# Patient Record
Sex: FEMALE | Race: WHITE | NOT HISPANIC OR LATINO | Employment: PART TIME | URBAN - METROPOLITAN AREA
[De-identification: names, ages, dates, MRNs, and addresses within clinical notes are randomized per-mention and may not be internally consistent; named-entity substitution may affect disease eponyms.]

---

## 2017-10-13 ENCOUNTER — LAB REQUISITION (OUTPATIENT)
Dept: LAB | Facility: OTHER | Age: 27
End: 2017-10-13

## 2017-10-13 DIAGNOSIS — Z13.9 SCREENING FOR CONDITION: ICD-10-CM

## 2017-10-13 PROCEDURE — 86803 HEPATITIS C AB TEST: CPT | Performed by: PREVENTIVE MEDICINE

## 2017-10-13 PROCEDURE — G0432 EIA HIV-1/HIV-2 SCREEN: HCPCS | Performed by: PREVENTIVE MEDICINE

## 2017-10-13 PROCEDURE — 87899 AGENT NOS ASSAY W/OPTIC: CPT | Performed by: PREVENTIVE MEDICINE

## 2017-10-14 LAB
HCV AB SER DONR QL: NORMAL
HIV1 P24 AG SER QL: NORMAL
HIV1+2 AB SER QL: NORMAL

## 2018-01-22 ENCOUNTER — LAB REQUISITION (OUTPATIENT)
Dept: LAB | Facility: OTHER | Age: 28
End: 2018-01-22
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
## 2018-01-22 DIAGNOSIS — Z77.21 EXPOSURE TO BODY FLUIDS BY CONTAMINATED HYPODERMIC NEEDLE STICK: ICD-10-CM

## 2018-01-22 PROCEDURE — 87899 AGENT NOS ASSAY W/OPTIC: CPT | Performed by: PREVENTIVE MEDICINE

## 2018-01-22 PROCEDURE — G0432 EIA HIV-1/HIV-2 SCREEN: HCPCS | Performed by: PREVENTIVE MEDICINE

## 2018-01-22 PROCEDURE — 86803 HEPATITIS C AB TEST: CPT | Performed by: PREVENTIVE MEDICINE

## 2018-01-23 LAB
HCV AB SER DONR QL: NORMAL
HIV1 P24 AG SER QL: NORMAL
HIV1+2 AB SER QL: NORMAL

## 2018-07-20 ENCOUNTER — LAB REQUISITION (OUTPATIENT)
Dept: LAB | Facility: OTHER | Age: 28
End: 2018-07-20

## 2018-07-20 DIAGNOSIS — IMO0002 NEEDLE STICK INJURY: ICD-10-CM

## 2018-07-20 PROCEDURE — G0432 EIA HIV-1/HIV-2 SCREEN: HCPCS | Performed by: PREVENTIVE MEDICINE

## 2018-07-20 PROCEDURE — 87899 AGENT NOS ASSAY W/OPTIC: CPT | Performed by: PREVENTIVE MEDICINE

## 2018-07-20 PROCEDURE — 86803 HEPATITIS C AB TEST: CPT | Performed by: PREVENTIVE MEDICINE

## 2018-07-21 LAB
HCV AB SER DONR QL: NORMAL
HIV1 P24 AG SER QL: NORMAL
HIV1+2 AB SER QL: NORMAL

## 2020-04-17 LAB
EXTERNAL CHLAMYDIA SCREEN: NORMAL
EXTERNAL GONORRHEA SCREEN: NORMAL
EXTERNAL VDRL: NORMAL

## 2020-11-19 ENCOUNTER — HOSPITAL ENCOUNTER (INPATIENT)
Facility: HOSPITAL | Age: 30
LOS: 2 days | Discharge: HOME | End: 2020-11-22
Attending: OBSTETRICS & GYNECOLOGY | Admitting: OBSTETRICS & GYNECOLOGY

## 2020-11-19 PROCEDURE — 87635 SARS-COV-2 COVID-19 AMP PRB: CPT | Performed by: OBSTETRICS & GYNECOLOGY

## 2020-11-19 PROCEDURE — 99201: CPT

## 2020-11-20 ENCOUNTER — ANESTHESIA (OUTPATIENT)
Dept: LABOR AND DELIVERY | Facility: HOSPITAL | Age: 30
End: 2020-11-20

## 2020-11-20 ENCOUNTER — ANESTHESIA EVENT (OUTPATIENT)
Dept: LABOR AND DELIVERY | Facility: HOSPITAL | Age: 30
End: 2020-11-20

## 2020-11-20 PROBLEM — Z37.9 NORMAL LABOR: Status: ACTIVE | Noted: 2020-11-20

## 2020-11-20 LAB
ABO GROUP BLD: NORMAL
AMPHET+METHAMPHET UR QL: NEGATIVE
BACTERIA UR QL AUTO: ABNORMAL /HPF
BARBITURATES UR QL SCN: NEGATIVE
BASOPHILS # BLD AUTO: 0.05 10*3/MM3 (ref 0–0.2)
BASOPHILS NFR BLD AUTO: 0.6 % (ref 0–1.5)
BENZODIAZ UR QL SCN: NEGATIVE
BILIRUB UR QL STRIP: NEGATIVE
BLD GP AB SCN SERPL QL: NEGATIVE
CANNABINOIDS SERPL QL: NEGATIVE
CLARITY UR: CLEAR
COCAINE UR QL: NEGATIVE
COLOR UR: YELLOW
DEPRECATED RDW RBC AUTO: 42.9 FL (ref 37–54)
EOSINOPHIL # BLD AUTO: 0.04 10*3/MM3 (ref 0–0.4)
EOSINOPHIL NFR BLD AUTO: 0.5 % (ref 0.3–6.2)
ERYTHROCYTE [DISTWIDTH] IN BLOOD BY AUTOMATED COUNT: 12.5 % (ref 12.3–15.4)
GLUCOSE UR STRIP-MCNC: NEGATIVE MG/DL
HCT VFR BLD AUTO: 38.1 % (ref 34–46.6)
HGB BLD-MCNC: 13.3 G/DL (ref 12–15.9)
HGB UR QL STRIP.AUTO: ABNORMAL
HYALINE CASTS UR QL AUTO: ABNORMAL /LPF
IMM GRANULOCYTES # BLD AUTO: 0.06 10*3/MM3 (ref 0–0.05)
IMM GRANULOCYTES NFR BLD AUTO: 0.7 % (ref 0–0.5)
KETONES UR QL STRIP: NEGATIVE
LEUKOCYTE ESTERASE UR QL STRIP.AUTO: NEGATIVE
LYMPHOCYTES # BLD AUTO: 2.81 10*3/MM3 (ref 0.7–3.1)
LYMPHOCYTES NFR BLD AUTO: 34.1 % (ref 19.6–45.3)
MCH RBC QN AUTO: 32.5 PG (ref 26.6–33)
MCHC RBC AUTO-ENTMCNC: 34.9 G/DL (ref 31.5–35.7)
MCV RBC AUTO: 93.2 FL (ref 79–97)
METHADONE UR QL SCN: NEGATIVE
MONOCYTES # BLD AUTO: 0.61 10*3/MM3 (ref 0.1–0.9)
MONOCYTES NFR BLD AUTO: 7.4 % (ref 5–12)
NEUTROPHILS NFR BLD AUTO: 4.68 10*3/MM3 (ref 1.7–7)
NEUTROPHILS NFR BLD AUTO: 56.7 % (ref 42.7–76)
NITRITE UR QL STRIP: NEGATIVE
NRBC BLD AUTO-RTO: 0 /100 WBC (ref 0–0.2)
OPIATES UR QL: NEGATIVE
OXYCODONE UR QL SCN: NEGATIVE
PH UR STRIP.AUTO: 6.5 [PH] (ref 5–8)
PLATELET # BLD AUTO: 200 10*3/MM3 (ref 140–450)
PMV BLD AUTO: 10.9 FL (ref 6–12)
PROT UR QL STRIP: NEGATIVE
RBC # BLD AUTO: 4.09 10*6/MM3 (ref 3.77–5.28)
RBC # UR: ABNORMAL /HPF
REF LAB TEST METHOD: ABNORMAL
RH BLD: POSITIVE
SARS-COV-2 RDRP RESP QL NAA+PROBE: NOT DETECTED
SP GR UR STRIP: <=1.005 (ref 1–1.03)
SQUAMOUS #/AREA URNS HPF: ABNORMAL /HPF
T&S EXPIRATION DATE: NORMAL
UROBILINOGEN UR QL STRIP: ABNORMAL
WBC # BLD AUTO: 8.25 10*3/MM3 (ref 3.4–10.8)
WBC UR QL AUTO: ABNORMAL /HPF

## 2020-11-20 PROCEDURE — 81001 URINALYSIS AUTO W/SCOPE: CPT | Performed by: OBSTETRICS & GYNECOLOGY

## 2020-11-20 PROCEDURE — 0UQMXZZ REPAIR VULVA, EXTERNAL APPROACH: ICD-10-PCS | Performed by: OBSTETRICS & GYNECOLOGY

## 2020-11-20 PROCEDURE — C1755 CATHETER, INTRASPINAL: HCPCS

## 2020-11-20 PROCEDURE — 80307 DRUG TEST PRSMV CHEM ANLYZR: CPT | Performed by: OBSTETRICS & GYNECOLOGY

## 2020-11-20 PROCEDURE — 86901 BLOOD TYPING SEROLOGIC RH(D): CPT | Performed by: OBSTETRICS & GYNECOLOGY

## 2020-11-20 PROCEDURE — 86850 RBC ANTIBODY SCREEN: CPT | Performed by: OBSTETRICS & GYNECOLOGY

## 2020-11-20 PROCEDURE — 86900 BLOOD TYPING SEROLOGIC ABO: CPT | Performed by: OBSTETRICS & GYNECOLOGY

## 2020-11-20 PROCEDURE — C1755 CATHETER, INTRASPINAL: HCPCS | Performed by: ANESTHESIOLOGY

## 2020-11-20 PROCEDURE — 85025 COMPLETE CBC W/AUTO DIFF WBC: CPT | Performed by: OBSTETRICS & GYNECOLOGY

## 2020-11-20 RX ORDER — IBUPROFEN 600 MG/1
600 TABLET ORAL EVERY 8 HOURS PRN
Status: DISCONTINUED | OUTPATIENT
Start: 2020-11-20 | End: 2020-11-22 | Stop reason: HOSPADM

## 2020-11-20 RX ORDER — DIPHENHYDRAMINE HYDROCHLORIDE 50 MG/ML
12.5 INJECTION INTRAMUSCULAR; INTRAVENOUS EVERY 8 HOURS PRN
Status: DISCONTINUED | OUTPATIENT
Start: 2020-11-20 | End: 2020-11-20 | Stop reason: HOSPADM

## 2020-11-20 RX ORDER — DOCUSATE SODIUM 100 MG/1
100 CAPSULE, LIQUID FILLED ORAL 2 TIMES DAILY
Status: DISCONTINUED | OUTPATIENT
Start: 2020-11-20 | End: 2020-11-22 | Stop reason: HOSPADM

## 2020-11-20 RX ORDER — SODIUM CHLORIDE 0.9 % (FLUSH) 0.9 %
1-10 SYRINGE (ML) INJECTION AS NEEDED
Status: DISCONTINUED | OUTPATIENT
Start: 2020-11-20 | End: 2020-11-22 | Stop reason: HOSPADM

## 2020-11-20 RX ORDER — MISOPROSTOL 200 UG/1
800 TABLET ORAL AS NEEDED
Status: DISCONTINUED | OUTPATIENT
Start: 2020-11-20 | End: 2020-11-20 | Stop reason: HOSPADM

## 2020-11-20 RX ORDER — HYDROCODONE BITARTRATE AND ACETAMINOPHEN 5; 325 MG/1; MG/1
1 TABLET ORAL EVERY 4 HOURS PRN
Status: DISCONTINUED | OUTPATIENT
Start: 2020-11-20 | End: 2020-11-22 | Stop reason: HOSPADM

## 2020-11-20 RX ORDER — OXYTOCIN-SODIUM CHLORIDE 0.9% IV SOLN 30 UNIT/500ML 30-0.9/5 UT/ML-%
250 SOLUTION INTRAVENOUS CONTINUOUS
Status: ACTIVE | OUTPATIENT
Start: 2020-11-20 | End: 2020-11-20

## 2020-11-20 RX ORDER — PRENATAL VIT/IRON FUM/FOLIC AC 27MG-0.8MG
1 TABLET ORAL DAILY
Status: DISCONTINUED | OUTPATIENT
Start: 2020-11-20 | End: 2020-11-22 | Stop reason: HOSPADM

## 2020-11-20 RX ORDER — SODIUM CHLORIDE 0.9 % (FLUSH) 0.9 %
10 SYRINGE (ML) INJECTION AS NEEDED
Status: DISCONTINUED | OUTPATIENT
Start: 2020-11-20 | End: 2020-11-20 | Stop reason: HOSPADM

## 2020-11-20 RX ORDER — HYDROCORTISONE 25 MG/G
1 CREAM TOPICAL AS NEEDED
Status: DISCONTINUED | OUTPATIENT
Start: 2020-11-20 | End: 2020-11-22 | Stop reason: HOSPADM

## 2020-11-20 RX ORDER — OXYTOCIN-SODIUM CHLORIDE 0.9% IV SOLN 30 UNIT/500ML 30-0.9/5 UT/ML-%
125 SOLUTION INTRAVENOUS CONTINUOUS PRN
Status: DISCONTINUED | OUTPATIENT
Start: 2020-11-20 | End: 2020-11-22 | Stop reason: HOSPADM

## 2020-11-20 RX ORDER — SODIUM CHLORIDE, SODIUM LACTATE, POTASSIUM CHLORIDE, CALCIUM CHLORIDE 600; 310; 30; 20 MG/100ML; MG/100ML; MG/100ML; MG/100ML
125 INJECTION, SOLUTION INTRAVENOUS CONTINUOUS
Status: DISCONTINUED | OUTPATIENT
Start: 2020-11-20 | End: 2020-11-22 | Stop reason: HOSPADM

## 2020-11-20 RX ORDER — SODIUM CHLORIDE 0.9 % (FLUSH) 0.9 %
3 SYRINGE (ML) INJECTION EVERY 12 HOURS SCHEDULED
Status: DISCONTINUED | OUTPATIENT
Start: 2020-11-20 | End: 2020-11-20 | Stop reason: HOSPADM

## 2020-11-20 RX ORDER — ONDANSETRON 4 MG/1
4 TABLET, FILM COATED ORAL EVERY 6 HOURS PRN
Status: DISCONTINUED | OUTPATIENT
Start: 2020-11-20 | End: 2020-11-22 | Stop reason: HOSPADM

## 2020-11-20 RX ORDER — OXYTOCIN-SODIUM CHLORIDE 0.9% IV SOLN 30 UNIT/500ML 30-0.9/5 UT/ML-%
250 SOLUTION INTRAVENOUS CONTINUOUS PRN
Status: DISPENSED | OUTPATIENT
Start: 2020-11-20 | End: 2020-11-20

## 2020-11-20 RX ORDER — CALCIUM CARBONATE 200(500)MG
2 TABLET,CHEWABLE ORAL 3 TIMES DAILY PRN
Status: DISCONTINUED | OUTPATIENT
Start: 2020-11-20 | End: 2020-11-22 | Stop reason: HOSPADM

## 2020-11-20 RX ORDER — ONDANSETRON 2 MG/ML
4 INJECTION INTRAMUSCULAR; INTRAVENOUS EVERY 6 HOURS PRN
Status: DISCONTINUED | OUTPATIENT
Start: 2020-11-20 | End: 2020-11-22 | Stop reason: HOSPADM

## 2020-11-20 RX ORDER — OXYTOCIN-SODIUM CHLORIDE 0.9% IV SOLN 30 UNIT/500ML 30-0.9/5 UT/ML-%
999 SOLUTION INTRAVENOUS ONCE
Status: COMPLETED | OUTPATIENT
Start: 2020-11-20 | End: 2020-11-20

## 2020-11-20 RX ORDER — MAGNESIUM CARB/ALUMINUM HYDROX 105-160MG
30 TABLET,CHEWABLE ORAL ONCE
Status: DISCONTINUED | OUTPATIENT
Start: 2020-11-20 | End: 2020-11-20 | Stop reason: HOSPADM

## 2020-11-20 RX ORDER — OXYTOCIN-SODIUM CHLORIDE 0.9% IV SOLN 30 UNIT/500ML 30-0.9/5 UT/ML-%
125 SOLUTION INTRAVENOUS CONTINUOUS PRN
Status: COMPLETED | OUTPATIENT
Start: 2020-11-20 | End: 2020-11-20

## 2020-11-20 RX ORDER — ERYTHROMYCIN 5 MG/G
OINTMENT OPHTHALMIC
Status: DISPENSED
Start: 2020-11-20 | End: 2020-11-20

## 2020-11-20 RX ORDER — FAMOTIDINE 10 MG/ML
20 INJECTION, SOLUTION INTRAVENOUS ONCE AS NEEDED
Status: DISCONTINUED | OUTPATIENT
Start: 2020-11-20 | End: 2020-11-20 | Stop reason: HOSPADM

## 2020-11-20 RX ORDER — OXYTOCIN-SODIUM CHLORIDE 0.9% IV SOLN 30 UNIT/500ML 30-0.9/5 UT/ML-%
999 SOLUTION INTRAVENOUS ONCE
Status: DISCONTINUED | OUTPATIENT
Start: 2020-11-20 | End: 2020-11-22 | Stop reason: HOSPADM

## 2020-11-20 RX ORDER — MISOPROSTOL 200 UG/1
800 TABLET ORAL EVERY 8 HOURS PRN
Status: DISCONTINUED | OUTPATIENT
Start: 2020-11-20 | End: 2020-11-22 | Stop reason: HOSPADM

## 2020-11-20 RX ORDER — ONDANSETRON 2 MG/ML
4 INJECTION INTRAMUSCULAR; INTRAVENOUS ONCE AS NEEDED
Status: DISCONTINUED | OUTPATIENT
Start: 2020-11-20 | End: 2020-11-20 | Stop reason: HOSPADM

## 2020-11-20 RX ORDER — OXYCODONE HYDROCHLORIDE AND ACETAMINOPHEN 5; 325 MG/1; MG/1
1 TABLET ORAL EVERY 4 HOURS PRN
Status: DISCONTINUED | OUTPATIENT
Start: 2020-11-20 | End: 2020-11-22 | Stop reason: HOSPADM

## 2020-11-20 RX ORDER — METHYLERGONOVINE MALEATE 0.2 MG/ML
200 INJECTION INTRAVENOUS ONCE AS NEEDED
Status: DISCONTINUED | OUTPATIENT
Start: 2020-11-20 | End: 2020-11-20 | Stop reason: HOSPADM

## 2020-11-20 RX ORDER — EPHEDRINE SULFATE 50 MG/ML
5 INJECTION, SOLUTION INTRAVENOUS AS NEEDED
Status: DISCONTINUED | OUTPATIENT
Start: 2020-11-20 | End: 2020-11-20 | Stop reason: HOSPADM

## 2020-11-20 RX ORDER — CARBOPROST TROMETHAMINE 250 UG/ML
250 INJECTION, SOLUTION INTRAMUSCULAR AS NEEDED
Status: DISCONTINUED | OUTPATIENT
Start: 2020-11-20 | End: 2020-11-20 | Stop reason: HOSPADM

## 2020-11-20 RX ORDER — DIPHENHYDRAMINE HCL 25 MG
25 CAPSULE ORAL NIGHTLY PRN
Status: DISCONTINUED | OUTPATIENT
Start: 2020-11-20 | End: 2020-11-22 | Stop reason: HOSPADM

## 2020-11-20 RX ORDER — BISACODYL 10 MG
10 SUPPOSITORY, RECTAL RECTAL DAILY PRN
Status: DISCONTINUED | OUTPATIENT
Start: 2020-11-21 | End: 2020-11-22 | Stop reason: HOSPADM

## 2020-11-20 RX ORDER — LIDOCAINE HYDROCHLORIDE AND EPINEPHRINE 15; 5 MG/ML; UG/ML
INJECTION, SOLUTION EPIDURAL AS NEEDED
Status: DISCONTINUED | OUTPATIENT
Start: 2020-11-20 | End: 2020-11-20 | Stop reason: SURG

## 2020-11-20 RX ORDER — PHYTONADIONE 1 MG/.5ML
INJECTION, EMULSION INTRAMUSCULAR; INTRAVENOUS; SUBCUTANEOUS
Status: DISPENSED
Start: 2020-11-20 | End: 2020-11-20

## 2020-11-20 RX ORDER — LANOLIN
CREAM (ML) TOPICAL
Status: DISCONTINUED | OUTPATIENT
Start: 2020-11-20 | End: 2020-11-22 | Stop reason: HOSPADM

## 2020-11-20 RX ORDER — LIDOCAINE HYDROCHLORIDE 10 MG/ML
5 INJECTION, SOLUTION EPIDURAL; INFILTRATION; INTRACAUDAL; PERINEURAL AS NEEDED
Status: DISCONTINUED | OUTPATIENT
Start: 2020-11-20 | End: 2020-11-20 | Stop reason: HOSPADM

## 2020-11-20 RX ADMIN — SODIUM CHLORIDE, POTASSIUM CHLORIDE, SODIUM LACTATE AND CALCIUM CHLORIDE 125 ML/HR: 600; 310; 30; 20 INJECTION, SOLUTION INTRAVENOUS at 04:58

## 2020-11-20 RX ADMIN — Medication 1 TABLET: at 10:47

## 2020-11-20 RX ADMIN — Medication 10 ML/HR: at 00:58

## 2020-11-20 RX ADMIN — LIDOCAINE HYDROCHLORIDE AND EPINEPHRINE 3 ML: 15; 5 INJECTION, SOLUTION EPIDURAL at 00:54

## 2020-11-20 RX ADMIN — IBUPROFEN 600 MG: 600 TABLET, FILM COATED ORAL at 10:48

## 2020-11-20 RX ADMIN — EPHEDRINE SULFATE 5 MG: 50 INJECTION INTRAVENOUS at 02:26

## 2020-11-20 RX ADMIN — HYDROCODONE BITARTRATE AND ACETAMINOPHEN 1 TABLET: 5; 325 TABLET ORAL at 10:47

## 2020-11-20 RX ADMIN — DOCUSATE SODIUM 100 MG: 100 CAPSULE, LIQUID FILLED ORAL at 19:57

## 2020-11-20 RX ADMIN — Medication 1 APPLICATION: at 10:47

## 2020-11-20 RX ADMIN — IBUPROFEN 600 MG: 600 TABLET, FILM COATED ORAL at 19:56

## 2020-11-20 RX ADMIN — HYDROCODONE BITARTRATE AND ACETAMINOPHEN 1 TABLET: 5; 325 TABLET ORAL at 19:56

## 2020-11-20 RX ADMIN — OXYTOCIN 125 ML/HR: 10 INJECTION, SOLUTION INTRAMUSCULAR; INTRAVENOUS at 07:59

## 2020-11-20 RX ADMIN — HYDROCODONE BITARTRATE AND ACETAMINOPHEN 1 TABLET: 5; 325 TABLET ORAL at 15:08

## 2020-11-20 RX ADMIN — SODIUM CHLORIDE, POTASSIUM CHLORIDE, SODIUM LACTATE AND CALCIUM CHLORIDE 999 ML/HR: 600; 310; 30; 20 INJECTION, SOLUTION INTRAVENOUS at 00:59

## 2020-11-20 RX ADMIN — OXYTOCIN 999 ML/HR: 10 INJECTION INTRAVENOUS at 06:20

## 2020-11-20 RX ADMIN — DOCUSATE SODIUM 100 MG: 100 CAPSULE, LIQUID FILLED ORAL at 10:47

## 2020-11-20 NOTE — PROGRESS NOTES
"Discharge Planning Assessment  [facility]     Patient Name: Anisha Mckeon  MRN: [id number]  Today's Date: 11/20/2020    Admit Date: 11/19/2020    Discharge Needs Assessment    No documentation.       Discharge Plan     Row Name 11/20/20 1552       Plan    Plan  Infant to discharge home with mother when medically ready. LCSW will follow for cord toxicology results. Naldo Eldridge, LUIW    Plan Comments  Mother: Anisha Mckeon;  [id number] Infant: Dianne Mckeon  [phone] LCSW was consulted for \" HX heroin use, had relapse during pregnancy and was in treatment\". LCSW spoke with NAWAF Lakhani who had no additional concerns. Of note, mother had a negative urine toxicology screen on file. There is no urine toxicology on infant. Cord toxicology was sent, LCSW will follow for cord toxicology results and will report to CPS if warranted. LCSW met with mother alone at bedside. Mother holding and interacting appropriately with infant during discussion. Mother verified address, phone and insurance. Mother reports she has not spoken with [employer] yet about adding infant to coverage. LCSW assured mother they will be contacting her to assist in adding infant to insurance. Mother reports she has a car seat, crib, bassinette, clothes, diapers and other supplies for infant. Mother plans to breastfeed and shared this is her third child so she feels comfortable doing so.  Mother reports a good support system in her boyfriend, mother, dad and friends. Mother has plans on infant follow up with [facility] [place] and feels comfortable scheduling appointments and will have transportation to appointments. Mother denies any violence, threats, or feeling unsafe. Mother denies any needs or concerns. LCSW educated mother about cord toxicology being sent and if it is positive a CPS report would be made at that time. Mother denies any substance use. Mother polite and cooperative with LCSW during discussion. " Mother denied any other needs or concerns. LCSW provided mother with resources packet and briefly discussed resources in packet. Packet includes info on WIC, HANDS, infant supplies, domestic violence, transportation, counseling, and general community resources. LCSW will follow for cord toxicology results. Naldo Eldirdge LCSW    Final Discharge Disposition Code  01 - home or self-care        Continued Care and Services - Admitted Since 11/19/2020    Coordination has not been started for this encounter.         Demographic Summary     Row Name 11/20/20 1552       General Information    Admission Type  inpatient    Arrived From  home    Referral Source  nursing    Reason for Consult  substance use concerns        Functional Status    No documentation.       Psychosocial     Row Name 11/20/20 1552       Emotion Mood WDL    Emotion/Mood/Affect WDL  WDL       Speech WDL    Speech WDL  WDL       Perceptual State WDL    Perceptual State WDL  WDL       Thought Process WDL    Thought Process WDL  WDL       Intellectual Performance WDL    Intellectual Performance WDL  WDL        Abuse/Neglect     Row Name 11/20/20 1552       Personal Safety    Feels Unsafe at Home or Work/School  no    Feels Threatened by Someone  no    Does Anyone Try to Keep You From Having Contact with Others or Doing Things Outside Your Home?  no    Physical Signs of Abuse Present  no        Legal    No documentation.       Substance Abuse    No documentation.       Patient Forms    No documentation.           Naldo Eldridge

## 2020-11-20 NOTE — NURSING NOTE
Repositioned to right tilt, fetal heart rate decreased to 100bpm for approx 80 seconds, with a slow return to baseline.  Continue to monitor closely.

## 2020-11-20 NOTE — ANESTHESIA PREPROCEDURE EVALUATION
Anesthesia Evaluation     Patient summary reviewed and Nursing notes reviewed   NPO Solid Status: > 8 hours  NPO Liquid Status: > 2 hours           Airway   Dental      Pulmonary - negative pulmonary ROS   Cardiovascular   Exercise tolerance: good (4-7 METS)    Rhythm: regular    (-) hypertension      Neuro/Psych- negative ROS  (-) seizures, CVA  GI/Hepatic/Renal/Endo - negative ROS   (-) diabetes    Musculoskeletal (-) negative ROS    Abdominal    Substance History - negative use  (-) alcohol use, drug use     OB/GYN    (+) Pregnant,         Other - negative ROS                       Anesthesia Plan    ASA 2     epidural       Anesthetic plan, all risks, benefits, and alternatives have been provided, discussed and informed consent has been obtained with: patient.

## 2020-11-20 NOTE — LACTATION NOTE
This note was copied from a baby's chart.  P3 term baby 10 hrs old. Mom reports he is breast feeding well so far. She nursed her other two children for 3 months and her milk dried up after she started back at work. She has a personal pump at home. Reviewed how to tell if baby is getting enough breast milk and encouraged to call for any assist or questions.

## 2020-11-20 NOTE — OBED NOTES
"[facility]  Anisha Simon  : 1990  MRN: [id number]  CSN: [id number]    OB ED Provider Note    Subjective   Chief Complaint   Patient presents with   • Rupture of Membranes     Anisha Simon is a 30 y.o. year old  with an Estimated Date of Delivery: 20 currently at 40w2d presenting with SROM at 2200.  She started eligio every 5 min prior to SROM, increasing to every 4 with increased intensity since.  She denies VB.  FM is present.      Prenatal care has been with Dr. Lorenzana.  It has been complicated by narcotics use.  She was in the MOST program however did not continue with it.  No record of subutex use in her prenatal record.  .    OB History    Para Term  AB Living   4 2 2 0 1 2   SAB TAB Ectopic Molar Multiple Live Births   1 0 0 0 0 2      # Outcome Date GA Lbr Grupo/2nd Weight Sex Delivery Anes PTL Lv   4 Current            3 Term 10/01/16 39w3d / 01:37 2864 g (6 lb 5 oz) F Vag-Spont EPI N AKSHAT      Name: LEONIDAS SIMON      Apgar1: 7  Apgar5: 8   2 SAB 09/30/15           1 Term 13 40w0d  2948 g (6 lb 8 oz) F Vag-Spont EPI N AKSHAT      Name: Rayray     Past Medical History:   Diagnosis Date   • Abnormal Pap smear of cervix    • HPV (human papilloma virus) infection      Past Surgical History:   Procedure Laterality Date   • APPENDECTOMY     • CERVICAL BIOPSY  W/ LOOP ELECTRODE EXCISION       No current facility-administered medications for this encounter.     No Known Allergies  Social History    Tobacco Use      Smoking status: Former Smoker        Quit date: 2020        Years since quittin.7      Smokeless tobacco: Never Used    Review of Systems   All other systems reviewed and are negative.        Objective   /75 (BP Location: Right arm, Patient Position: Sitting)   Pulse 81   Resp 18   Ht 160 cm (63\")   Breastfeeding Yes   BMI 26.31 kg/m²   General: well developed; well nourished  no acute distress   Abdomen: soft, non-tender; no " masses  gravid    FHT's: category 1      Cervix: was checked (by RN): 3.5 cm / 70 % / -2 grossly ruptured with + NTZ   Presentation: cephalic   Contractions: every 4 minutes   Chest: Unlabored respirations    CV:  RRR   Ext:   No C/C/E   Back: CVA tenderness is deferred bilateral        Prenatal Labs  Lab Results   Component Value Date    HGB 12.3 10/02/2016    RUBELLAABIGG Immune 2016    HEPBSAG Non-Reactive 2017    ABSCRN Negative 2016    SPE7OND4 Non-Reactive 2018    HEPCVIRUSABY Non-Reactive 2018    STREPGPB Negative 2016       Current Labs Reviewed   Pregnancy 28 week labs:   Lab Results   Component Value Date    HGB 12.3 10/02/2016    HCT 37.3 10/02/2016     Prenatal Panel:   Lab Results   Component Value Date    HGB 12.3 10/02/2016    RUBELLAABIGG Immune 2016    HEPBSAG Non-Reactive 2017    ABSCRN Negative 2016    FEG9FVB8 Non-Reactive 2018    HEPCVIRUSABY Non-Reactive 2018    STREPGPB Negative 2016          Assessment   1. IUP at 40w2d  2. SROM- GBS negative, in active labor.       Plan   1. Admit to L&D for anticipated .  She desires an epidural.   anticipated.  Dr. Rivera notified and is assuming management.      Becka Hanson MD  2020  00:02 EST

## 2020-11-20 NOTE — H&P
Labor and Delivery H&P     Patient Identification:  Name: Anisha Mckeon  Age: 30 y.o.  Sex: female  :  1990  MRN: [id number]       2020   02:25 EST              Subjective:  Anisha Mckeon is a 30 y.o., , female with estimate due date  of: 2020, by Patient Reported  at 40w2d weeks gestation who is being admitted for labor management s/p SROM. Patient reports ROM followed by painful ctx last evening.   Fetal Movement: normal. 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    Noninvasive genetic screens: patient declined  One hour GTT: Normal  MBT= O Positive , Rubella Immune , GBS Negative    Past Medical History:   Diagnosis Date   • Abnormal Pap smear of cervix    • HPV (human papilloma virus) infection      Past Surgical History:   Procedure Laterality Date   • APPENDECTOMY     • CERVICAL BIOPSY  W/ LOOP ELECTRODE EXCISION       OB History    Para Term  AB Living   4 2 2   1 2   SAB TAB Ectopic Molar Multiple Live Births   1       0 2      # Outcome Date GA Lbr Grupo/2nd Weight Sex Delivery Anes PTL Lv   4 Current            3 Term 10/01/16 39w3d / 01:37 2864 g (6 lb 5 oz) F Vag-Spont EPI N AKSHAT   2 SAB 09/30/15           1 Term 13 40w0d  2948 g (6 lb 8 oz) F Vag-Spont EPI N AKSHAT     No Known Allergies  Social History     Socioeconomic History   • Marital status: Significant Other     Spouse name: Not on file   • Number of children: Not on file   • Years of education: Not on file   • Highest education level: Not on file   Tobacco Use   • Smoking status: Former Smoker     Quit date: 2020     Years since quittin.7   • Smokeless tobacco: Never Used   Substance and Sexual Activity   • Alcohol use: No   • Drug use: Yes     Types: Heroin   • Sexual activity: Yes     Partners: Male     History reviewed. No pertinent family history.  Immunization History   Administered Date(s) Administered   • Tdap  08/24/2016       Objective:  Vital signs:  Temp:  [98.7 °F (37.1 °C)] 98.7 °F (37.1 °C)  Heart Rate:  [70-90] 71  Resp:  [18] 18  BP: ()/(51-75) 97/51    General:   alert, appears stated age and cooperative  Skin:   normal  HEENT:  PERRLA  Lungs:   clear to auscultation bilaterally  Heart:   regular rate and rhythm, S1, S2 normal, no murmur, click, rub or gallop  Abdomen:  soft, non-tender; bowel sounds normal; no masses,  no organomegaly  Pelvis:  5-6/90/-1  FHT:  130s BPM Category 1 a few late decelerations after epidural a/w low maternal BP 97/51 relieved with ephedrine  Uterine Size: size equals dates  Presentations: cephalic    Lab Review  Recent Results (from the past 24 hour(s))   COVID-19, [facility] IN-HOUSE,NP Swab (NO TRANSPORT MEDIA) 2 HR TAT - Swab, Nasopharynx    Collection Time: 11/19/20 11:46 PM    Specimen: Nasopharynx; Swab   Result Value Ref Range    COVID19 Not Detected Not Detected - Ref. Range   Type & Screen    Collection Time: 11/20/20 12:11 AM    Specimen: Blood   Result Value Ref Range    ABO Type O     RH type Positive     Antibody Screen Negative     T&S Expiration Date 11/23/2020 11:59:59 PM    CBC Auto Differential    Collection Time: 11/20/20 12:11 AM    Specimen: Blood   Result Value Ref Range    WBC 8.25 3.40 - 10.80 10*3/mm3    RBC 4.09 3.77 - 5.28 10*6/mm3    Hemoglobin 13.3 12.0 - 15.9 g/dL    Hematocrit 38.1 34.0 - 46.6 %    MCV 93.2 79.0 - 97.0 fL    MCH 32.5 26.6 - 33.0 pg    MCHC 34.9 31.5 - 35.7 g/dL    RDW 12.5 12.3 - 15.4 %    RDW-SD 42.9 37.0 - 54.0 fl    MPV 10.9 6.0 - 12.0 fL    Platelets 200 140 - 450 10*3/mm3    Neutrophil % 56.7 42.7 - 76.0 %    Lymphocyte % 34.1 19.6 - 45.3 %    Monocyte % 7.4 5.0 - 12.0 %    Eosinophil % 0.5 0.3 - 6.2 %    Basophil % 0.6 0.0 - 1.5 %    Immature Grans % 0.7 (H) 0.0 - 0.5 %    Neutrophils, Absolute 4.68 1.70 - 7.00 10*3/mm3    Lymphocytes, Absolute 2.81 0.70 - 3.10 10*3/mm3    Monocytes, Absolute 0.61 0.10 - 0.90 10*3/mm3     Eosinophils, Absolute 0.04 0.00 - 0.40 10*3/mm3    Basophils, Absolute 0.05 0.00 - 0.20 10*3/mm3    Immature Grans, Absolute 0.06 (H) 0.00 - 0.05 10*3/mm3    nRBC 0.0 0.0 - 0.2 /100 WBC   Urinalysis With Microscopic If Indicated (No Culture) - Urine, Clean Catch    Collection Time: 20 12:23 AM    Specimen: Urine, Clean Catch   Result Value Ref Range    Color, UA Yellow Yellow, Straw    Appearance, UA Clear Clear    pH, UA 6.5 5.0 - 8.0    Specific Gravity, UA <=1.005 1.005 - 1.030    Glucose, UA Negative Negative    Ketones, UA Negative Negative    Bilirubin, UA Negative Negative    Blood, UA Trace (A) Negative    Protein, UA Negative Negative    Leuk Esterase, UA Negative Negative    Nitrite, UA Negative Negative    Urobilinogen, UA 0.2 E.U./dL 0.2 - 1.0 E.U./dL   Urine Drug Screen - Urine, Clean Catch    Collection Time: 20 12:23 AM    Specimen: Urine, Clean Catch   Result Value Ref Range    Amphet/Methamphet, Screen Negative Negative    Barbiturates Screen, Urine Negative Negative    Benzodiazepine Screen, Urine Negative Negative    Cocaine Screen, Urine Negative Negative    Opiate Screen Negative Negative    THC, Screen, Urine Negative Negative    Methadone Screen, Urine Negative Negative    Oxycodone Screen, Urine Negative Negative   Urinalysis, Microscopic Only - Urine, Clean Catch    Collection Time: 20 12:23 AM    Specimen: Urine, Clean Catch   Result Value Ref Range    RBC, UA 6-12 (A) None Seen, 0-2 /HPF    WBC, UA 0-2 None Seen, 0-2 /HPF    Bacteria, UA None Seen None Seen /HPF    Squamous Epithelial Cells, UA 0-2 None Seen, 0-2 /HPF    Hyaline Casts, UA 0-2 None Seen /LPF    Methodology Automated Microscopy         Assessment/Plan:  Patient Active Problem List   Diagnosis   • Pregnancy   • Normal labor     30 y.o.  40w2d adm for mgmt labor following SROM, progressing without augmentation. Comfortable with epidural.       Risks, benefits, alternatives and possible complications  have been discussed in detail with the patient.  Pre-admission, admission, and post admission procedures and expectations were discussed in detail.  All questions answered, all appropriate consents will be signed at the Hospital. Admission is planned for today. Continue present management. and Expectant management.    Leeanne Rivera MD  11/20/2020   02:25 EST

## 2020-11-20 NOTE — L&D DELIVERY NOTE
[facility]  Vaginal Delivery Note    Anisha Mckeon  1990   [id number]      Delivery     Delivery: Vaginal, Spontaneous     YOB: 2022    Time of Birth:  Gestational Age 6:15 AM   40w2d     Anesthesia: Epidural     Delivering clinician: Leeanne Rivera    Forceps?   No   Vacuum? No    Shoulder dystocia present: No        Delivery narrative: Smiley is a 30-year-old  female  admitted at 40 weeks and 2 days with spontaneous rupture of membranes.  Her prenatal care has been complicated by history of drug use now sober many years.  She also has a history of LEEP procedure.  Her maternal blood type is O+, she is rubella immune and her group B strep culture was negative.  She was made to labor and delivery and immediately received an epidural.  Fetal heart rate tracing was in the 130s to 140s and category 1.  She progressed without incident not requiring Pitocin.  She became completely dilated just after 0600.  She ended up pushing with 1 contraction to deliver a viable male  from a left occiput anterior position and compound presentation with the left  arm.  Bulb suction was performed on the perineum.  The remainder the fetal body was delivered atraumatically with maternal expulsive forces.  He was laid on the maternal abdomen where he was vigorous and crying.  Delayed cord clamping was performed.  Additional delivery statistics as per below.  Weight pending due to Kangaroo care.  The placenta then delivered spontaneously and intact with normal configuration and three-vessel cord.  The fundus was firm and estimated blood loss was 150 cc.  3-0 Vicryl was used to repair a left first-degree labial laceration and a first-degree midline laceration.  Vaginal exam at and showed no foreign bodies or sponges in the vagina and count was correct per myself and delivery team.  Epidural worked beautifully and overall there are no complications.    Infant    Findings: male  infant      Infant observations: Weight: No birth weight on file.   Length:   in  Observations/Comments:  scale 4      Apgars: 8  @ 1 minute /    9  @ 5 minutes         Placenta, Cord, and Fluid    Placenta delivered  Spontaneous  at   11/20/2020  6:20 AM     Cord: 3 vessels  present.   Nuchal Cord?  no   Cord blood obtained: Yes    Cord gases obtained:  No    Cord gas results: Venous:  No results found for: PHCVEN    Arterial:  No results found for: PHCART     Repair    Episiotomy: Not recorded     No    Lacerations: Yes  Laceration Information  Laceration Repaired?   Perineal: 1st      Periurethral:       Labial: left      Sulcus:       Vaginal:       Cervical:         Suture used for repair: 3-0 Vicryl   Estimated Blood Loss: Est. Blood Loss (mL): 150 mL(Filed from Delivery Summary) (11/20/20 0615)           Complications  none    Disposition  Mother to Mother Baby/Postpartum  in stable condition currently.  Baby to NBN  in stable condition currently.      Leeanne Rivera MD  11/20/20  06:37 EST

## 2020-11-20 NOTE — ANESTHESIA POSTPROCEDURE EVALUATION
"Patient: Anisha Mckeon    Procedure Summary     Date: 11/20/20 Room / Location:     Anesthesia Start: 0046 Anesthesia Stop: 0618    Procedure: LABOR ANALGESIA Diagnosis:     Scheduled Providers:  Provider: Lamine Vazquez MD    Anesthesia Type: epidural ASA Status: 2          Anesthesia Type: epidural    Vitals  Vitals Value Taken Time   /62 11/20/20 0636   Temp 36.5 °C (97.7 °F) 11/20/20 0630   Pulse 103 11/20/20 0636   Resp 18 11/20/20 0630   SpO2     Vitals shown include unvalidated device data.        Post Anesthesia Care and Evaluation    Patient location during evaluation: bedside  Patient participation: complete - patient participated  Level of consciousness: awake and alert  Pain management: adequate  Airway patency: patent  Anesthetic complications: No anesthetic complications    Cardiovascular status: acceptable  Respiratory status: acceptable  Hydration status: acceptable    Comments: /67 (BP Location: Right arm, Patient Position: Lying)   Pulse 96   Temp 36.5 °C (97.7 °F) (Oral)   Resp 18   Ht 160 cm (63\")   Wt 67.4 kg (148 lb 9.6 oz)   Breastfeeding Yes   BMI 26.32 kg/m²       "

## 2020-11-20 NOTE — ANESTHESIA PROCEDURE NOTES
Labor Epidural      Patient location during procedure: OB  Performed By  Anesthesiologist: Lamine Vazquez MD  Preanesthetic Checklist  Completed: patient identified, site marked, surgical consent, pre-op evaluation, timeout performed, IV checked, risks and benefits discussed and monitors and equipment checked  Prep:  Pt Position:sitting  Sterile Tech:cap, gloves and mask  Prep:chlorhexidine gluconate and isopropyl alcohol  Monitoring:blood pressure monitoring, continuous pulse oximetry and EKG  Epidural Block Procedure:  Approach:midline  Guidance:landmark technique and palpation technique  Location:L4-L5  Needle Type:Tuohy  Needle Gauge:18 G  Loss of Resistance Medium: saline  Loss of Resistance: 5cm  Cath Depth at skin:14 cm  Paresthesia: none  Aspiration:negative  Test Dose:negative  Post Assessment:  Dressing:occlusive dressing applied and secured with tape  Pt Tolerance:patient tolerated the procedure well with no apparent complications  Complications:no

## 2020-11-20 NOTE — NURSING NOTE
Taken out of throne position and repostioned to right lateral with peanut ball.  Feta; heart rate dropped down to the 70's for 160 seconds and slowly return to baseline.  Repositioned to left tilt, left lateral and right lateral.  Fetal heart rate dropped to 70's again with the next contraction for 4 minutes.  SVE done, complete and +1 station.  Dr. Rivera called and requested to come for delivery.

## 2020-11-21 PROBLEM — Z37.9 NORMAL LABOR: Status: RESOLVED | Noted: 2020-11-20 | Resolved: 2020-11-21

## 2020-11-21 LAB
BUPRENORPHINE UR QL: NEGATIVE NG/ML
HCT VFR BLD AUTO: 35.3 % (ref 34–46.6)
HGB BLD-MCNC: 12 G/DL (ref 12–15.9)

## 2020-11-21 PROCEDURE — 85018 HEMOGLOBIN: CPT | Performed by: OBSTETRICS & GYNECOLOGY

## 2020-11-21 PROCEDURE — 85014 HEMATOCRIT: CPT | Performed by: OBSTETRICS & GYNECOLOGY

## 2020-11-21 RX ORDER — OXYCODONE HYDROCHLORIDE AND ACETAMINOPHEN 5; 325 MG/1; MG/1
1 TABLET ORAL EVERY 4 HOURS PRN
Qty: 15 TABLET | Refills: 0 | Status: SHIPPED | OUTPATIENT
Start: 2020-11-21 | End: 2020-11-27

## 2020-11-21 RX ORDER — IBUPROFEN 600 MG/1
600 TABLET ORAL EVERY 8 HOURS PRN
Qty: 40 TABLET | Refills: 1 | Status: SHIPPED | OUTPATIENT
Start: 2020-11-21

## 2020-11-21 RX ADMIN — IBUPROFEN 600 MG: 600 TABLET, FILM COATED ORAL at 13:14

## 2020-11-21 RX ADMIN — HYDROCODONE BITARTRATE AND ACETAMINOPHEN 1 TABLET: 5; 325 TABLET ORAL at 08:50

## 2020-11-21 RX ADMIN — HYDROCODONE BITARTRATE AND ACETAMINOPHEN 1 TABLET: 5; 325 TABLET ORAL at 03:52

## 2020-11-21 RX ADMIN — DOCUSATE SODIUM 100 MG: 100 CAPSULE, LIQUID FILLED ORAL at 21:55

## 2020-11-21 RX ADMIN — HYDROCODONE BITARTRATE AND ACETAMINOPHEN 1 TABLET: 5; 325 TABLET ORAL at 17:17

## 2020-11-21 RX ADMIN — DOCUSATE SODIUM 100 MG: 100 CAPSULE, LIQUID FILLED ORAL at 08:50

## 2020-11-21 RX ADMIN — HYDROCODONE BITARTRATE AND ACETAMINOPHEN 1 TABLET: 5; 325 TABLET ORAL at 21:55

## 2020-11-21 RX ADMIN — IBUPROFEN 600 MG: 600 TABLET, FILM COATED ORAL at 03:52

## 2020-11-21 RX ADMIN — Medication 1 TABLET: at 08:50

## 2020-11-21 RX ADMIN — HYDROCODONE BITARTRATE AND ACETAMINOPHEN 1 TABLET: 5; 325 TABLET ORAL at 13:14

## 2020-11-21 RX ADMIN — IBUPROFEN 600 MG: 600 TABLET, FILM COATED ORAL at 21:55

## 2020-11-21 NOTE — PROGRESS NOTES
OB Postpartum Progress Note    Patient Identification:  Name: Anisha Mckeon  :  1990  MRN: [id number]    PPD 1  S/P   2020 10:12 EST    S:  Pt without c/o today. Pain controlled. Pt is breast feeding.  Lochia less than menses.    O:    BP      Temp      Pulse     Resp      SpO2          Gen - awake and alert  Abd - FF below the umbilicus, NT  Ext - no edema, NT/Symm    Lab Results (last 24 hours)     Procedure Component Value Units Date/Time    Hemoglobin & Hematocrit, Blood [[id number]]  (Normal) Collected: 20    Specimen: Blood Updated: 20     Hemoglobin 12.0 g/dL      Hematocrit 35.3 %             MBT = O +, Rubella immune    A/P:  Principal Problem:    Normal labor  Overview:  Active Problems:     (spontaneous vaginal delivery)  Overview:  Resolved Problems:    * No resolved hospital problems. *      PPD 1, s/p   Doing well, cont routine care. Infant's circ today. Patient is requesting early discharge.    Pita Antonio MD  Date: 2020 time: 10:12 EST

## 2020-11-21 NOTE — DISCHARGE SUMMARY
[facility] Discharge Summary       Patient Name: Anisha Mckeon      MRN: [id number]      : 1990      Date of Admission: 2020      Date of Discharge: 2020      Admission Diagnosis:  at 40w2d, history of drug abuse, history of LEEP      Discharge Diagnosis: , same      Hospital course: unremarkable      Discharge exam: NAD, breathing unlabored, acyanotic, abdomen soft, fundus below umbilicus, no edema BLE    Discharge condition: good    Follow-up: 6 wks in OB office       Discharge meds: Percocet 5/325 mg, Ibuprofen 600 mg      Pita Antonio MD

## 2020-11-22 VITALS
RESPIRATION RATE: 16 BRPM | WEIGHT: 148.6 LBS | SYSTOLIC BLOOD PRESSURE: 108 MMHG | OXYGEN SATURATION: 98 % | BODY MASS INDEX: 26.33 KG/M2 | DIASTOLIC BLOOD PRESSURE: 73 MMHG | TEMPERATURE: 97.9 F | HEART RATE: 76 BPM | HEIGHT: 63 IN

## 2020-11-22 RX ADMIN — HYDROCODONE BITARTRATE AND ACETAMINOPHEN 1 TABLET: 5; 325 TABLET ORAL at 07:30

## 2020-11-22 RX ADMIN — HYDROCODONE BITARTRATE AND ACETAMINOPHEN 1 TABLET: 5; 325 TABLET ORAL at 02:31

## 2020-11-22 RX ADMIN — DOCUSATE SODIUM 100 MG: 100 CAPSULE, LIQUID FILLED ORAL at 08:26

## 2020-11-22 RX ADMIN — Medication 1 TABLET: at 08:26

## 2020-11-22 RX ADMIN — IBUPROFEN 600 MG: 600 TABLET, FILM COATED ORAL at 07:30

## 2020-11-22 NOTE — LACTATION NOTE
This note was copied from a baby's chart.  Mother has infant latched to breast currently, denies any pain or discomfort, reports that infant latching/voiding well. She reports that infant has been sleepy at times for feedings today, had difficulty rousing for feed after circumcision. Demonstrated hand expression and encouraged mother to hand express and give EBM for any gaps in feeding. Discussed clusterfeeding and when to expect milk to come in. Encouraged to call as needed for assist.

## 2020-11-25 NOTE — PROGRESS NOTES
Continued Stay Note  [facility]     Patient Name: Anisha Mckeon  MRN: [id number]  Today's Date: 11/25/2020    Admit Date: 11/19/2020    Discharge Plan     Row Name 11/25/20 1454       Plan    Plan Comments  Mother: Anisha Mckeon;  [id number] Infant: Gage Hernández  [id number]. CSW reviewed cord toxicology results, which are negative. Referral to CPS is not warranted at this time. AISHWARYA Castillo        Discharge Codes    No documentation.       Expected Discharge Date and Time     Expected Discharge Date Expected Discharge Time    Nov 22, 2020             PENG Castillo

## 2021-04-16 ENCOUNTER — BULK ORDERING (OUTPATIENT)
Dept: CASE MANAGEMENT | Facility: OTHER | Age: 31
End: 2021-04-16

## 2021-04-16 DIAGNOSIS — Z23 IMMUNIZATION DUE: ICD-10-CM
